# Patient Record
Sex: MALE | Race: WHITE | HISPANIC OR LATINO | Employment: FULL TIME | URBAN - METROPOLITAN AREA
[De-identification: names, ages, dates, MRNs, and addresses within clinical notes are randomized per-mention and may not be internally consistent; named-entity substitution may affect disease eponyms.]

---

## 2019-01-07 ENCOUNTER — EVALUATION (OUTPATIENT)
Dept: PHYSICAL THERAPY | Facility: CLINIC | Age: 44
End: 2019-01-07
Payer: COMMERCIAL

## 2019-01-07 DIAGNOSIS — M54.12 CERVICAL RADICULOPATHY: Primary | ICD-10-CM

## 2019-01-07 PROCEDURE — 97110 THERAPEUTIC EXERCISES: CPT | Performed by: PHYSICAL THERAPIST

## 2019-01-07 PROCEDURE — G8981 BODY POS CURRENT STATUS: HCPCS | Performed by: PHYSICAL THERAPIST

## 2019-01-07 PROCEDURE — 97162 PT EVAL MOD COMPLEX 30 MIN: CPT | Performed by: PHYSICAL THERAPIST

## 2019-01-07 PROCEDURE — G8982 BODY POS GOAL STATUS: HCPCS | Performed by: PHYSICAL THERAPIST

## 2019-01-07 NOTE — LETTER
2019    Harmony Conrad MD  [address]  [facility]  [address] [address]    Patient: Tom Gibson   YOB: 1975   Date of Visit: 2019     Encounter Diagnosis     ICD-10-CM    1  Cervical radiculopathy M54 12        Dear Dr Ted Geiger:    Please review the attached Plan of Care from [address] recent visit  Please verify that you agree therapy should continue by signing the attached document and sending it back to our office  If you have any questions or concerns, please don't hesitate to call  Sincerely,    Justina Webb, PT      Referring Provider:      I certify that I have read the below Plan of Care and certify the need for these services furnished under this plan of treatment while under my care  Harmony Conrad MD  [address] [place] [address]  [address]: [phone]          PT Evaluation     Today's date: 2019  Patient name: Tom Gibson  : 1975  MRN: [id number]  Referring provider: Benita Saldana MD  Dx:   Encounter Diagnosis     ICD-10-CM    1  Cervical radiculopathy M54 12        Start Time: 1052  Stop Time: 1146  Total time in clinic (min): 54 minutes    Assessment  Assessment details: Patient is a 39year old male presenting to physical therapy with right cervical spine pain with radiating down right arm reaching right lateral three fingers  Patient is presenting with signs and symptoms consistent with referring diagnosis of right cervical radiculopathy with C6-C7 nerve root compression  Patient has contributing factors of decreased cervical ROM, decreased right UE strength, and decreased right median nerve mobility  These deficits are limiting his ability to carry boxes at work, sleep through the night without pain, Patient will benefit from physical therapy in order to address the deficits contributing to functional limitations and facilitate return to prior level of functioning   Patient was provided a home exercise program and demonstrated an understanding of exercises  Patient was advised to stop performing home exercise program if symptoms increase or new complaints developed  Verbal understanding demonstrated regarding home exercise program instructions  Patient was educated on and agreeable to plan of care  Impairments: abnormal or restricted ROM, impaired physical strength, lacks appropriate home exercise program and pain with function    Symptom irritability: moderateUnderstanding of Dx/Px/POC: good   Prognosis: good    Goals  Short term goals:  1) Patient will demonstrate decrease in cervical pain by 20-50% in 4 weeks  2) Patient will demonstrate improved cervical rotational ROM by > 5 degrees   in 4 weeks  3) Patient will demonstrate increased right UE strength in 4 weeks  Long term goals:  1) Patient will demonstrate ability to lift 25# box without right arm pain in 6 weeks  2) Patient will demonstrate sleep through night without pain  in 6 weeks  3) Patient will demonstrate ability to reach overhead without right shoulder pain in 6 weeks  4) Patient will demonstrate ability to look over right shoulder without pain in 6 weeks  5) Patient will demonstrate independence with HEP 6 weeks      Plan  Patient would benefit from: skilled physical therapy  Referral necessary: No  Planned modality interventions: cryotherapy  Planned therapy interventions: therapeutic training, therapeutic exercise, therapeutic activities, stretching, strengthening, neuromuscular re-education, manual therapy, joint mobilization, balance, graded exercise, graded activity, gait training, functional ROM exercises and flexibility  Frequency: 2x week  Duration in weeks: 6  Treatment plan discussed with: patient        Subjective Evaluation    History of Present Illness  Mechanism of injury: Patient reports that his pain started in his arm about one month ago  Patient reports that he reports that he woke up with pain   He reports that there was no trauma to cause this pain he reports that about a week later he started having radiating pain up and down his right arm reaching his fingers  Patient reports that his pain has been getting better since its onset  Patient reports that he has not had any imaging of his cervical spine  Patient reports that he was prescribed a muscle relaxer and naproxen for his pain  He reports that this helps  Patients that he gets tingling in his 1st, 2nd and 3rd finger  Patient denies pain in his left side  Patient reports that he has not had symptoms like this in the past  Patient reports that he has been completing some arm stretches which have been helping his pain  Patient reports that he has not had a history of cervical pain  Patient states that he has not had physical therapy prior to this onset  Patient reports that his goals for physical therapy are to get back to lift boxes, driving without pain, and sleep without being interrupted without pain  Pain  Current pain ratin  At best pain ratin  At worst pain rating: 10  Location: Right arm pain to lateral three fingers- intermittent, varying, deep "     Social Support  Lives in: multiple-level home  Lives with: spouse and young children    Employment status: working (- currently off of work due to symptoms )  Hand dominance: right  Exercise history: Patient rpeorts that he is avoiding using the right hand around the house  Paitient reports that he likes to hike, outdoor activity             Objective     Special Questions  Negative for faints, trouble swallowing, visual change, history of cancer and history of trauma    Additional Special Questions  Patient denies numbness or tingling in face or lips  Patient denies changes in ambulation     Neurological Testing     Sensation   Cervical/Thoracic   Left   Intact: light touch    Right   Intact: light touch    Reflexes   Left   Biceps (C5/C6): normal (2+)  Brachioradialis (C6): normal (2+)  Triceps (C7): normal (2+)    Right   Biceps (C5/C6): normal (2+)  Brachioradialis (C6): normal (2+)  Triceps (C7): normal (2+)    Active Range of Motion   Cervical/Thoracic Spine   Cervical    Flexion: 45 (increase right shoulder pain- no worse) degrees   Extension: 60 (decrease, no better) degrees   Left lateral flexion: 45 (increase - no worse) degrees   Right lateral flexion: 32 (increase, no worse ) degrees   Left rotation: 72 (NE) degrees   Right rotation: 70 (Increase right shoulder pain ) degrees     Additional Active Range of Motion Details  Repeated cervical retraction x 10: Decreased right shoulder pain  Repeated cervical retraction with overpressure 2 x 10: decreased right shoulder pain from 5/10 to 2/10 and improve right cervical rotation to 75 degrees without shoulder pain   Strength/Myotome Testing     Left Shoulder     Planes of Motion   Abduction: 4 (pain)     Isolated Muscles   Upper trapezius: 5     Right Shoulder     Planes of Motion   Abduction: 5     Isolated Muscles   Upper trapezius: 5     Left Elbow   Flexion: 5  Extension: 4 (pain)    Right Elbow   Flexion: 5  Extension: 5    Left Wrist/Hand   Wrist flexion: 5    Right Wrist/Hand   Wrist flexion: 5    Tests   Cervical     Right   Positive cervical distraction  Right Shoulder   Positive ULTT1         Flowsheet Rows      Most Recent Value   PT/OT G-Codes   Current Score  53   Projected Score  71   FOTO information reviewed  Yes   Assessment Type  Evaluation   G code set  Changing & Maintaining Body Position   Changing and Maintaining Body Position Current Status ()  CL   Changing and Maintaining Body Position Goal Status ()  CK          Precautions: Cervical Right Radiculopathy, As toelrated    Daily Treatment Diary     Manual  1/7            Manual cervical traction NV                                                                    Exercise Diary  1/7            Cervical reraction 2 x 10            Cerivcal retraction with overpressure 2 x 10            Right UE median nerve glides 10x                                                                                                                                                                                                                                             Modalities  1/7            Cervical ICE  PRN

## 2019-01-07 NOTE — PROGRESS NOTES
PT Evaluation     Today's date: 2019  Patient name: Rachel Piña  : 1975  MRN: [id number]  Referring provider: Emma Sandoval MD  Dx:   Encounter Diagnosis     ICD-10-CM    1  Cervical radiculopathy M54 12        Start Time: 1052  Stop Time: 1146  Total time in clinic (min): 54 minutes    Assessment  Assessment details: Patient is a 39year old male presenting to physical therapy with right cervical spine pain with radiating down right arm reaching right lateral three fingers  Patient is presenting with signs and symptoms consistent with referring diagnosis of right cervical radiculopathy with C6-C7 nerve root compression  Patient has contributing factors of decreased cervical ROM, decreased right UE strength, and decreased right median nerve mobility  These deficits are limiting his ability to carry boxes at work, sleep through the night without pain, Patient will benefit from physical therapy in order to address the deficits contributing to functional limitations and facilitate return to prior level of functioning  Patient was provided a home exercise program and demonstrated an understanding of exercises  Patient was advised to stop performing home exercise program if symptoms increase or new complaints developed  Verbal understanding demonstrated regarding home exercise program instructions  Patient was educated on and agreeable to plan of care       Impairments: abnormal or restricted ROM, impaired physical strength, lacks appropriate home exercise program and pain with function    Symptom irritability: moderateUnderstanding of Dx/Px/POC: good   Prognosis: good    Goals  Short term goals:  1) Patient will demonstrate decrease in cervical pain by 20-50% in 4 weeks  2) Patient will demonstrate improved cervical rotational ROM by > 5 degrees   in 4 weeks  3) Patient will demonstrate increased right UE strength in 4 weeks  Long term goals:  1) Patient will demonstrate ability to lift 25# box without right arm pain in 6 weeks  2) Patient will demonstrate sleep through night without pain  in 6 weeks  3) Patient will demonstrate ability to reach overhead without right shoulder pain in 6 weeks  4) Patient will demonstrate ability to look over right shoulder without pain in 6 weeks  5) Patient will demonstrate independence with HEP 6 weeks      Plan  Patient would benefit from: skilled physical therapy  Referral necessary: No  Planned modality interventions: cryotherapy  Planned therapy interventions: therapeutic training, therapeutic exercise, therapeutic activities, stretching, strengthening, neuromuscular re-education, manual therapy, joint mobilization, balance, graded exercise, graded activity, gait training, functional ROM exercises and flexibility  Frequency: 2x week  Duration in weeks: 6  Treatment plan discussed with: patient        Subjective Evaluation    History of Present Illness  Mechanism of injury: Patient reports that his pain started in his arm about one month ago  Patient reports that he reports that he woke up with pain  He reports that there was no trauma to cause this pain he reports that about a week later he started having radiating pain up and down his right arm reaching his fingers  Patient reports that his pain has been getting better since its onset  Patient reports that he has not had any imaging of his cervical spine  Patient reports that he was prescribed a muscle relaxer and naproxen for his pain  He reports that this helps  Patients that he gets tingling in his 1st, 2nd and 3rd finger  Patient denies pain in his left side  Patient reports that he has not had symptoms like this in the past  Patient reports that he has been completing some arm stretches which have been helping his pain  Patient reports that he has not had a history of cervical pain  Patient states that he has not had physical therapy prior to this onset   Patient reports that his goals for physical therapy are to get back to lift boxes, driving without pain, and sleep without being interrupted without pain  Pain  Current pain ratin  At best pain ratin  At worst pain rating: 10  Location: Right arm pain to lateral three fingers- intermittent, varying, deep "     Social Support  Lives in: multiple-level home  Lives with: spouse and young children    Employment status: working (- currently off of work due to symptoms )  Hand dominance: right  Exercise history: Patient rpeorts that he is avoiding using the right hand around the house  Paitient reports that he likes to hike, outdoor activity  Objective     Special Questions  Negative for faints, trouble swallowing, visual change, history of cancer and history of trauma    Additional Special Questions  Patient denies numbness or tingling in face or lips  Patient denies changes in ambulation     Neurological Testing     Sensation   Cervical/Thoracic   Left   Intact: light touch    Right   Intact: light touch    Reflexes   Left   Biceps (C5/C6): normal (2+)  Brachioradialis (C6): normal (2+)  Triceps (C7): normal (2+)    Right   Biceps (C5/C6): normal (2+)  Brachioradialis (C6): normal (2+)  Triceps (C7): normal (2+)    Active Range of Motion   Cervical/Thoracic Spine   Cervical    Flexion: 45 (increase right shoulder pain- no worse) degrees   Extension: 60 (decrease, no better) degrees   Left lateral flexion: 45 (increase - no worse) degrees   Right lateral flexion: 32 (increase, no worse ) degrees   Left rotation: 72 (NE) degrees   Right rotation: 70 (Increase right shoulder pain ) degrees     Additional Active Range of Motion Details  Repeated cervical retraction x 10: Decreased right shoulder pain  Repeated cervical retraction with overpressure 2 x 10: decreased right shoulder pain from 5/10 to 2/10 and improve right cervical rotation to 75 degrees without shoulder pain       Strength/Myotome Testing     Left Shoulder     Planes of Motion   Abduction: 4 (pain)     Isolated Muscles   Upper trapezius: 5     Right Shoulder     Planes of Motion   Abduction: 5     Isolated Muscles   Upper trapezius: 5     Left Elbow   Flexion: 5  Extension: 4 (pain)    Right Elbow   Flexion: 5  Extension: 5    Left Wrist/Hand   Wrist flexion: 5    Right Wrist/Hand   Wrist flexion: 5    Tests   Cervical     Right   Positive cervical distraction  Right Shoulder   Positive ULTT1         Flowsheet Rows      Most Recent Value   PT/OT G-Codes   Current Score  53   Projected Score  71   FOTO information reviewed  Yes   Assessment Type  Evaluation   G code set  Changing & Maintaining Body Position   Changing and Maintaining Body Position Current Status ()  CL   Changing and Maintaining Body Position Goal Status ()  CK          Precautions: Cervical Right Radiculopathy, As toelrated    Daily Treatment Diary     Manual  1/7            Manual cervical traction NV                                                                    Exercise Diary  1/7            Cervical reraction 2 x 10            Cerivcal retraction with overpressure 2 x 10            Right UE median nerve glides 10x                                                                                                                                                                                                                                             Modalities  1/7            Cervical ICE  PRN

## 2019-01-08 ENCOUNTER — OFFICE VISIT (OUTPATIENT)
Dept: PHYSICAL THERAPY | Facility: CLINIC | Age: 44
End: 2019-01-08
Payer: COMMERCIAL

## 2019-01-08 DIAGNOSIS — M54.12 CERVICAL RADICULOPATHY: Primary | ICD-10-CM

## 2019-01-08 PROCEDURE — 97110 THERAPEUTIC EXERCISES: CPT

## 2019-01-08 PROCEDURE — 97140 MANUAL THERAPY 1/> REGIONS: CPT

## 2019-01-08 NOTE — PROGRESS NOTES
Daily Note     Today's date: 2019  Patient name: Elena Randhawa  : 1975  MRN: [id number]  Referring provider: Giovanni Fry, PT  Dx:   Encounter Diagnosis     ICD-10-CM    1  Cervical radiculopathy M54 12                   Subjective: Pt states he woke up with R c/s pain down R shoulder and into 1st 3 fingers  Pt states he did not perform HEP  Pt states he had no pain after leaving PT for IE yesterday  Objective: See treatment diary below  Precautions: Cervical Right Radiculopathy, As toelrated    Daily Treatment Diary     Manual             Manual cervical traction NV 8'                                                                   Exercise Diary             Cervical reraction 2 x 10 2x10           Cerivcal retraction with overpressure 2 x 10 2x10           Right UE median nerve glides 10x x10           UBE  3'f/3'b           Cervical retraction with ext  2x20           pec stretch  30"x5           TB row/ext  GTB 2x10                                                                                                                                                                                        Modalities              Cervical ICE  PRN                                          Assessment: Pain decreased to R forearm in R 1st 3 digits post cervical retraction with OP   1st 3 digit pain abolished with addition of c/s retraction with ext  Pain in R forearm abolished after manual cervical traction  After traction, pt reported return of symptoms in R elbow/forearm, which was not changed with c/s retraction with ext  Forearm pain was then relieved with self median n  Wilson  Plan: Continue per plan of care

## 2019-01-09 ENCOUNTER — PROCEDURE VISIT (OUTPATIENT)
Dept: NEUROLOGY | Facility: CLINIC | Age: 44
End: 2019-01-09
Payer: COMMERCIAL

## 2019-01-09 DIAGNOSIS — S54.12XA INJURY OF MEDIAN NERVE AT LEFT FOREARM LEVEL, INITIAL ENCOUNTER: ICD-10-CM

## 2019-01-09 DIAGNOSIS — G56.01 CARPAL TUNNEL SYNDROME ON RIGHT: ICD-10-CM

## 2019-01-09 PROCEDURE — 95910 NRV CNDJ TEST 7-8 STUDIES: CPT | Performed by: PHYSICAL MEDICINE & REHABILITATION

## 2019-01-09 PROCEDURE — 95886 MUSC TEST DONE W/N TEST COMP: CPT | Performed by: PHYSICAL MEDICINE & REHABILITATION

## 2019-01-09 NOTE — PROGRESS NOTES
The procedure was discussed with the patient  Verbal consent was obtained after discussing risks and benefits  A sterile concentric needle electrode was used  The patient tolerated the procedure well  There were no complications  EMG REPORT    Test:Bilateral Upper Extremities    Performing Dr:  KANDIS Guthrie  The left and right median and ulnar motor conduction velocities and compound muscle action potentials were normal with normal distal latencies across the wrists  The left median and  bilateral ulnar sensory conduction velocity and sensory action potentials were also normal with normal distal latencies across the wrists  The left median sensory peak latency latency prolongedged at 3 4 milliseconds  with a normal sensory action with a normal sensory action potential amplitude  The right median palmar evoked response was prolonged by 0 7 milliseconds as compared to the right ulnar palmar evoked response to the same distance  The left median palmar evoked response was prolonged by 0 4 milliseconds as compared to the left ulnar palmar evoked response at the same distance  The left and right median and ulnar F wave latencies were within normal limits  Concentric needle EMG of the upper extremities bilaterally and cervical paraspinal muscles revealed no spontaneous activities  Early recruited motor units appear normal   Recruitment patterns were full or full for effort  INTERPRETATION: There   is electrophysiologic evidence of a:    1  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

## 2019-01-11 ENCOUNTER — OFFICE VISIT (OUTPATIENT)
Dept: PHYSICAL THERAPY | Facility: CLINIC | Age: 44
End: 2019-01-11
Payer: COMMERCIAL

## 2019-01-11 DIAGNOSIS — M54.12 CERVICAL RADICULOPATHY: Primary | ICD-10-CM

## 2019-01-11 PROCEDURE — 97140 MANUAL THERAPY 1/> REGIONS: CPT | Performed by: PHYSICAL THERAPIST

## 2019-01-11 PROCEDURE — 97110 THERAPEUTIC EXERCISES: CPT | Performed by: PHYSICAL THERAPIST

## 2019-01-11 PROCEDURE — 97112 NEUROMUSCULAR REEDUCATION: CPT | Performed by: PHYSICAL THERAPIST

## 2019-01-11 NOTE — PROGRESS NOTES
Daily Note     Today's date: 2019  Patient name: Micaela Ovalles  : 1975  MRN: [id number]  Referring provider: Ted Loyola PT  Dx:   Encounter Diagnosis     ICD-10-CM    1  Cervical radiculopathy M54 12        Start Time: 813  Stop Time:   Total time in clinic (min): 39 minutes    Subjective: Patient reports that he has been feeling better with less pain in his right arm  He states that he has only been having pain on the lateral aspect of his right elbow         Objective: See treatment diary below  Precautions: Cervical Right Radiculopathy, As toelrated     Daily Treatment Diary      Manual                   Manual cervical traction NV 8'  TK 8 min                  Right UE radial nerve glides     TK 3 min                                                                                               Exercise Diary                   Cervical reraction 2 x 10 2x10  2x10                 Cerivcal retraction with overpressure 2 x 10 2x10  2x10                 Right UE median nerve glides 10x x10  x10                 UBE   3'f/3'b  3'f/3'b                 Cervical retraction with ext   2x20  2x20                 pec stretch   30"x5  30"x5                 TB row/ext   GTB 2x10  GTB 2x10                  Dynamic right wrist extensor stretch     3 sec hhold x 10                  Right wrist flexor stretch      5 sec x 10                  Right upper trapezius stretch     10 sec x 5                  no monies     2 x 10                                                                                                                                                                                                                                               Modalities                       Cervical ICE  PRN                                                                            Assessment: Patient reported abolished right elbow pain following manual cervical traction and right radial nerve glides  He was educated to add dynamic right wrist extensor stretch at home in order to maintain progress made within today's session  He will continue to benefit from PT in order to improve right radial nerve mobility and improver cervical spine mobility  Plan: Continue per plan of care

## 2019-02-08 NOTE — PROGRESS NOTES
PT Discharge    Today's date: 2019  Patient name: Linden Kent  : 1975  MRN: [id number]  Referring provider: Mary Daniel, PT  Dx:   Encounter Diagnosis     ICD-10-CM    1  Cervical radiculopathy M54 12        Start Time: 813  Stop Time:   Total time in clinic (min): 39 minutes    Assessment  Assessment details: Patient did not return to physical therapy following this appointment  Objective measurement and functional goals were unable to be updated at this time  Patient will be discharged from PT at this time      Impairments: abnormal or restricted ROM, impaired physical strength, lacks appropriate home exercise program and pain with function    Symptom irritability: moderateUnderstanding of Dx/Px/POC: good   Prognosis: good    Goals  Short term goals:  1) Patient will demonstrate decrease in cervical pain by 20-50% in 4 weeks- not met  2) Patient will demonstrate improved cervical rotational ROM by > 5 degrees   in 4 weeks- not met  3) Patient will demonstrate increased right UE strength in 4 weeks- not met  Long term goals:  1) Patient will demonstrate ability to lift 25# box without right arm pain in 6 weeks- not met  2) Patient will demonstrate sleep through night without pain  in 6 weeks-not met  3) Patient will demonstrate ability to reach overhead without right shoulder pain in 6 weeks-not met  4) Patient will demonstrate ability to look over right shoulder without pain in 6 weeks-not met  5) Patient will demonstrate independence with HEP 6 weeks-not met       Plan  Patient would benefit from: skilled physical therapy  Referral necessary: No  Planned modality interventions: cryotherapy  Planned therapy interventions: therapeutic training, therapeutic exercise, therapeutic activities, stretching, strengthening, neuromuscular re-education, manual therapy, joint mobilization, balance, graded exercise, graded activity, gait training, functional ROM exercises and flexibility  Frequency: 2x week  Duration in weeks: 6  Treatment plan discussed with: patient        Subjective Evaluation    Pain  Current pain ratin  At best pain ratin  At worst pain rating: 10  Location: Right arm pain to lateral three fingers- intermittent, varying, deep "     Social Support  Lives in: multiple-level home  Lives with: spouse and young children    Employment status: working (- currently off of work due to symptoms )  Hand dominance: right  Exercise history: Patient rpeorts that he is avoiding using the right hand around the house  Paitient reports that he likes to hike, outdoor activity  Objective     Concurrent Complaints  Negative for faints, trouble swallowing, visual change, history of cancer and history of trauma    Additional Special Questions  Patient denies numbness or tingling in face or lips  Patient denies changes in ambulation     Neurological Testing     Sensation   Cervical/Thoracic   Left   Intact: light touch    Right   Intact: light touch    Reflexes   Left   Biceps (C5/C6): normal (2+)  Brachioradialis (C6): normal (2+)  Triceps (C7): normal (2+)    Right   Biceps (C5/C6): normal (2+)  Brachioradialis (C6): normal (2+)  Triceps (C7): normal (2+)    Active Range of Motion   Cervical/Thoracic Spine       Cervical    Flexion: 45 (increase right shoulder pain- no worse) degrees   Extension: 60 (decrease, no better) degrees      Left lateral flexion: 45 (increase - no worse) degrees      Right lateral flexion: 32 (increase, no worse ) degrees      Left rotation: 72 (NE) degrees  Right rotation: 70 (Increase right shoulder pain ) degrees           Additional Active Range of Motion Details  Repeated cervical retraction x 10: Decreased right shoulder pain  Repeated cervical retraction with overpressure 2 x 10: decreased right shoulder pain from 5/10 to 2/10 and improve right cervical rotation to 75 degrees without shoulder pain       Strength/Myotome Testing     Left Shoulder Planes of Motion   Abduction: 4 (pain)     Isolated Muscles   Upper trapezius: 5     Right Shoulder     Planes of Motion   Abduction: 5     Isolated Muscles   Upper trapezius: 5     Left Elbow   Flexion: 5  Extension: 4 (pain)    Right Elbow   Flexion: 5  Extension: 5    Left Wrist/Hand   Wrist flexion: 5    Right Wrist/Hand   Wrist flexion: 5    Tests     Right Shoulder   Positive [id number]             Precautions: Cervical Right Radiculopathy, As toelrated

## 2021-02-16 ENCOUNTER — TELEPHONE (OUTPATIENT)
Dept: GASTROENTEROLOGY | Facility: CLINIC | Age: 46
End: 2021-02-16

## 2021-08-03 ENCOUNTER — EVALUATION (OUTPATIENT)
Dept: PHYSICAL THERAPY | Facility: CLINIC | Age: 46
End: 2021-08-03
Payer: COMMERCIAL

## 2021-08-03 DIAGNOSIS — M54.41 ACUTE RIGHT-SIDED LOW BACK PAIN WITH RIGHT-SIDED SCIATICA: Primary | ICD-10-CM

## 2021-08-03 PROCEDURE — 97110 THERAPEUTIC EXERCISES: CPT | Performed by: PHYSICAL THERAPIST

## 2021-08-03 PROCEDURE — 97014 ELECTRIC STIMULATION THERAPY: CPT | Performed by: PHYSICAL THERAPIST

## 2021-08-03 PROCEDURE — G0283 ELEC STIM OTHER THAN WOUND: HCPCS | Performed by: PHYSICAL THERAPIST

## 2021-08-03 PROCEDURE — 97161 PT EVAL LOW COMPLEX 20 MIN: CPT | Performed by: PHYSICAL THERAPIST

## 2021-08-03 NOTE — LETTER
2021    Lizbeth Mcmahan MD  [address]  [facility]  [place] [address]    Patient: Tri Jackman   YOB: 1975   Date of Visit: 8/3/2021     Encounter Diagnosis     ICD-10-CM    1  Acute right-sided low back pain with right-sided sciatica  M54 41        Dear Dr Morgan Mclaughlin:    Thank you for your recent referral of Tri Jackman  Please review the attached evaluation summary from [address] [place] [address] recent visit  Please verify that you agree with the plan of care by signing the attached order  If you have any questions or concerns, please do not hesitate to call  I sincerely appreciate the opportunity to share in the care of one of your patients and hope to have another opportunity to work with you in the near future  Sincerely,    Yash Bellamy, PT      Referring Provider:      I certify that I have read the below Plan of Care and certify the need for these services furnished under this plan of treatment while under my care  Adolfo Garcia Garry  [address] [place] [address]  Via Fax: [phone]          PT Evaluation     Today's date: 8/3/2021  Patient name: Tri Jackman  : 1975  MRN: [id number]  Referring provider: Veleta Buerger, MD  Dx:   Encounter Diagnosis     ICD-10-CM    1  Acute right-sided low back pain with right-sided sciatica  M54 41                   Assessment  Assessment details: Patient is a 51 t/o male with chief complaints of right sided lumbar pain with right sided sciatica that began two weeks ago  Patient presents with decreased functional mobility due to increased pain, decreased hip & LE strength, decreased lumbar ROM, gait deviations including antalgic gait associated with nerve root impingement and possible HNP  Today he responds well to extension based exercise with a centralization of symptoms  No red flags present    Patient will benefit from skilled physical therapy to address impairment and improve functional mobility  PT needed to allow for return to maximal function and improve quality of life  Impairments: abnormal or restricted ROM, activity intolerance, impaired physical strength, lacks appropriate home exercise program and pain with function  Understanding of Dx/Px/POC: good   Prognosis: good    Goals  STG within 4 weeks:   1  Patient to be independent in HEP  2  Reduce pain by 50% to improve quality of life  3  Improve lumbar extension ROM to no more than minimal limitation  LTG within 8 weeks:   1  Patient to be independent in ADLs/IADLs without difficulty  2  Patient to be able to sit with lumbar roll for 10-20 minutes  3  Patient to be able to drive short distances with minimal pain  Plan  Patient would benefit from: skilled physical therapy and PT eval  Planned modality interventions: cryotherapy, hydrotherapy, unattended electrical stimulation, H-Wave, TENS and traction  Planned therapy interventions: therapeutic training, therapeutic exercise, therapeutic activities, stretching, strengthening, postural training, patient education, neuromuscular re-education, manual therapy, joint mobilization, IADL retraining, activity modification, ADL retraining, ADL training, body mechanics training, flexibility, functional ROM exercises, gait training, graded activity, graded exercise, graded motor, home exercise program and abdominal trunk stabilization  Frequency: 2x week  Duration in weeks: 8  Plan of Care beginning date: 8/3/2021  Plan of Care expiration date: 9/28/2021  Treatment plan discussed with: patient        Subjective Evaluation    History of Present Illness  Onset date: two weeks ago  Mechanism of injury: Patient is a 50 y/o male with chief complaints of right sided low back pain that radiates down his right leg into his foot  This began two weeks ago when he lifted a tree trunk  He states he was bent over and his pain began about 5 minutes after lifting    He reports weakness within his RLE  He denies any bowel/bladder symptoms or saddle anesthesia  He was seen at PCP office and referred for evaluation and treatment of low back pain  Not a recurrent problem   Pain  Current pain ratin  At best pain ratin  At worst pain rating: 10  Quality: cramping and sharp  Progression: no change    Social Support  Steps to enter house: yes  Stairs in house: yes   Lives in: multiple-level home  Lives with: spouse and young children    Work[employer]- not currently working   Exercise history: active with work, but no formal exercise       Diagnostic Tests  No diagnostic tests performed  Treatments  Current treatment: medication  Patient Goals  Patient goals for therapy: return to work  Patient goal: "get well"         Objective     Active Range of Motion     Lumbar   Extension:  with pain Restriction level: maximal    Additional Active Range of Motion Details  AROM flexion/side bending not tested; maximally limited with extension   Mechanical Assessment    Cervical      Thoracic      Lumbar    Standing extension: repeated movements  Pain location: centralized  Pain intensity: better  Pain level: decreased  Lying extension: repeated movements  Pain location: centralized  Pain intensity: better  Pain level: decreased    Strength/Myotome Testing     Left Hip   Planes of Motion   Flexion: 3+    Right Hip   Planes of Motion   Flexion: 3+    Left Knee   Flexion: 4+  Extension: 4+    Right Knee   Flexion: 4+  Extension: 4+    Left Ankle/Foot   Dorsiflexion: 4+  Great toe extension: 5    Right Ankle/Foot   Dorsiflexion: 4+  Great toe extension: 5    Additional Strength Details  Right sided low back pain with left hip flexionr              Precautions: none   Increased time spent on patient education with diagnosis, prognosis, goals of therapy, progression of therapy, and plan of care  All questions answered  Patient instructed to call clinic with questions or concerns  Written HEP provided to patient  Manuals 8/3                                                                Neuro Re-Ed             Sitting with lumbar roll Pt edu            TA engagement NV            Adductor set NV            Glute set NV                                                   Ther Ex             Pt Edu KS            Standing lumbar extension x10             Prone press up on elbows 2x10             Prone lying during heat/stim 10 min                                                                Ther Activity                                       Gait Training                                       Modalities             TENS- B lumbar paraspinals 10'            Hot pack 10'                  [employer]    Access Code: [email or web address]

## 2021-08-03 NOTE — PROGRESS NOTES
PT Evaluation     Today's date: 8/3/2021  Patient name: Cassy Cheung  : 1975  MRN: [id number]  Referring provider: Daniel Lynch MD  Dx:   Encounter Diagnosis     ICD-10-CM    1  Acute right-sided low back pain with right-sided sciatica  M54 41                   Assessment  Assessment details: Patient is a 51 t/o male with chief complaints of right sided lumbar pain with right sided sciatica that began two weeks ago  Patient presents with decreased functional mobility due to increased pain, decreased hip & LE strength, decreased lumbar ROM, gait deviations including antalgic gait associated with nerve root impingement and possible HNP  Today he responds well to extension based exercise with a centralization of symptoms  No red flags present  Patient will benefit from skilled physical therapy to address impairment and improve functional mobility  PT needed to allow for return to maximal function and improve quality of life  Impairments: abnormal or restricted ROM, activity intolerance, impaired physical strength, lacks appropriate home exercise program and pain with function  Understanding of Dx/Px/POC: good   Prognosis: good    Goals  STG within 4 weeks:   1  Patient to be independent in HEP  2  Reduce pain by 50% to improve quality of life  3  Improve lumbar extension ROM to no more than minimal limitation  LTG within 8 weeks:   1  Patient to be independent in ADLs/IADLs without difficulty  2  Patient to be able to sit with lumbar roll for 10-20 minutes  3  Patient to be able to drive short distances with minimal pain       Plan  Patient would benefit from: skilled physical therapy and PT eval  Planned modality interventions: cryotherapy, hydrotherapy, unattended electrical stimulation, H-Wave, TENS and traction  Planned therapy interventions: therapeutic training, therapeutic exercise, therapeutic activities, stretching, strengthening, postural training, patient education, neuromuscular re-education, manual therapy, joint mobilization, IADL retraining, activity modification, ADL retraining, ADL training, body mechanics training, flexibility, functional ROM exercises, gait training, graded activity, graded exercise, graded motor, home exercise program and abdominal trunk stabilization  Frequency: 2x week  Duration in weeks: 8  Plan of Care beginning date: 8/3/2021  Plan of Care expiration date: 2021  Treatment plan discussed with: patient        Subjective Evaluation    History of Present Illness  Onset date: two weeks ago  Mechanism of injury: Patient is a 50 y/o male with chief complaints of right sided low back pain that radiates down his right leg into his foot  This began two weeks ago when he lifted a tree trunk  He states he was bent over and his pain began about 5 minutes after lifting  He reports weakness within his RLE  He denies any bowel/bladder symptoms or saddle anesthesia  He was seen at PCP office and referred for evaluation and treatment of low back pain             Not a recurrent problem   Pain  Current pain ratin  At best pain ratin  At worst pain rating: 10  Quality: cramping and sharp  Progression: no change    Social Support  Steps to enter house: yes  Stairs in house: yes   Lives in: multiple-level home  Lives with: spouse and young children    Workin [employer]- not currently working   Exercise history: active with work, but no formal exercise       Diagnostic Tests  No diagnostic tests performed  Treatments  Current treatment: medication  Patient Goals  Patient goals for therapy: return to work  Patient goal: "get well"         Objective     Active Range of Motion     Lumbar   Extension:  with pain Restriction level: maximal    Additional Active Range of Motion Details  AROM flexion/side bending not tested; maximally limited with extension   Mechanical Assessment    Cervical      Thoracic      Lumbar    Standing extension: repeated movements  Pain location: centralized  Pain intensity: better  Pain level: decreased  Lying extension: repeated movements  Pain location: centralized  Pain intensity: better  Pain level: decreased    Strength/Myotome Testing     Left Hip   Planes of Motion   Flexion: 3+    Right Hip   Planes of Motion   Flexion: 3+    Left Knee   Flexion: 4+  Extension: 4+    Right Knee   Flexion: 4+  Extension: 4+    Left Ankle/Foot   Dorsiflexion: 4+  Great toe extension: 5    Right Ankle/Foot   Dorsiflexion: 4+  Great toe extension: 5    Additional Strength Details  Right sided low back pain with left hip flexionr              Precautions: none   Increased time spent on patient education with diagnosis, prognosis, goals of therapy, progression of therapy, and plan of care  All questions answered  Patient instructed to call clinic with questions or concerns  Written HEP provided to patient  Manuals 8/3                                                                Neuro Re-Ed             Sitting with lumbar roll Pt edu            TA engagement NV            Adductor set NV            Glute set NV                                                   Ther Ex             Pt Edu KS            Standing lumbar extension x10             Prone press up on elbows 2x10             Prone lying during heat/stim 10 min                                                                Ther Activity                                       Gait Training                                       Modalities             TENS- B lumbar paraspinals 10'            Hot pack 10'                  [employer]    Access Code: [email or web address]

## 2021-08-05 ENCOUNTER — OFFICE VISIT (OUTPATIENT)
Dept: PHYSICAL THERAPY | Facility: CLINIC | Age: 46
End: 2021-08-05
Payer: COMMERCIAL

## 2021-08-05 DIAGNOSIS — M54.41 ACUTE RIGHT-SIDED LOW BACK PAIN WITH RIGHT-SIDED SCIATICA: Primary | ICD-10-CM

## 2021-08-05 PROCEDURE — 97014 ELECTRIC STIMULATION THERAPY: CPT | Performed by: PHYSICAL THERAPIST

## 2021-08-05 PROCEDURE — 97140 MANUAL THERAPY 1/> REGIONS: CPT | Performed by: PHYSICAL THERAPIST

## 2021-08-05 PROCEDURE — 97110 THERAPEUTIC EXERCISES: CPT | Performed by: PHYSICAL THERAPIST

## 2021-08-05 PROCEDURE — 97112 NEUROMUSCULAR REEDUCATION: CPT | Performed by: PHYSICAL THERAPIST

## 2021-08-05 PROCEDURE — G0283 ELEC STIM OTHER THAN WOUND: HCPCS | Performed by: PHYSICAL THERAPIST

## 2021-08-05 NOTE — PROGRESS NOTES
Daily Note     Today's date: 2021  Patient name: Klever Aguilar  : 1975  MRN: [id number]  Referring provider: Mitchel Lawrence MD  Dx:   Encounter Diagnosis     ICD-10-CM    1  Acute right-sided low back pain with right-sided sciatica  M54 41                   Subjective: Patient reports 4-5/10 pain at start of session  States he's doing better  He does report pain when he drives for a long period of time  Objective: See treatment diary below      Assessment: Tolerated treatment well  Patient does well with continued extension based exercise  He remains with some right sided low back pain, but this is decreased following  to lumbar region  Progression of HEP to include core strengthening exercises; updated HEP provided to patient  Patient would benefit from continued PT      Plan: Continue per plan of care  Precautions: none   Updated written HEP provided to patient         Manuals 8/3 8/5           Prone Lumbar   8 min                                                  Neuro Re-Ed             Sitting with lumbar roll Pt edu            TA engagement NV 3"x20            Adductor set NV 3"x20            Glute set NV            Hooklying TB hip abd  Red 2x10 ea                                      Ther Ex             Pt Edu KS            Standing lumbar extension x10  2x10            Prone press up on elbows 2x10  2x10            Prone lying during heat/stim 10 min 10 min           Recumbent bike w roll  7 min                                                  Ther Activity                                       Gait Training                                       Modalities             TENS- B lumbar paraspinals 10' 10'           Hot pack 10' 10'

## 2021-08-09 ENCOUNTER — APPOINTMENT (OUTPATIENT)
Dept: PHYSICAL THERAPY | Facility: CLINIC | Age: 46
End: 2021-08-09
Payer: COMMERCIAL

## 2021-08-13 ENCOUNTER — APPOINTMENT (OUTPATIENT)
Dept: PHYSICAL THERAPY | Facility: CLINIC | Age: 46
End: 2021-08-13
Payer: COMMERCIAL

## 2021-08-16 ENCOUNTER — APPOINTMENT (OUTPATIENT)
Dept: PHYSICAL THERAPY | Facility: CLINIC | Age: 46
End: 2021-08-16
Payer: COMMERCIAL

## 2021-08-23 ENCOUNTER — OFFICE VISIT (OUTPATIENT)
Dept: PHYSICAL THERAPY | Facility: CLINIC | Age: 46
End: 2021-08-23
Payer: COMMERCIAL

## 2021-08-23 DIAGNOSIS — M54.41 ACUTE RIGHT-SIDED LOW BACK PAIN WITH RIGHT-SIDED SCIATICA: Primary | ICD-10-CM

## 2021-08-23 PROCEDURE — 97112 NEUROMUSCULAR REEDUCATION: CPT | Performed by: PHYSICAL THERAPIST

## 2021-08-23 PROCEDURE — 97110 THERAPEUTIC EXERCISES: CPT | Performed by: PHYSICAL THERAPIST

## 2021-08-23 PROCEDURE — 97014 ELECTRIC STIMULATION THERAPY: CPT | Performed by: PHYSICAL THERAPIST

## 2021-08-23 PROCEDURE — 97140 MANUAL THERAPY 1/> REGIONS: CPT | Performed by: PHYSICAL THERAPIST

## 2021-08-23 PROCEDURE — G0283 ELEC STIM OTHER THAN WOUND: HCPCS | Performed by: PHYSICAL THERAPIST

## 2021-08-23 NOTE — PROGRESS NOTES
Daily Note     Today's date: 2021  Patient name: Alex Moya  : 1975  MRN: [id number]  Referring provider: Raymond Pond MD  Dx:   Encounter Diagnosis     ICD-10-CM    1  Acute right-sided low back pain with right-sided sciatica  M54 41                   Subjective: Patient states he feels 85% global rating of improvement  Reports pain when driving for long periods of time  States he drove 12 hours recently, which caused him pain  Objective: See treatment diary below      Assessment: Tolerated treatment well  Continued good response to  and extension based exercise  However, recommend more consistent attendance at therapy in order to maximize effects of therapy  Patient would benefit from continued PT  Less pain from start to finish of session  Plan: Continue per plan of care        Precautions: none         Manuals 8/3 8/5 8/23          Prone Lumbar   8 min 8 min                                                  Neuro Re-Ed             Sitting with lumbar roll Pt edu  HEP review          TA engagement NV 3"x20  3"x20           Adductor set NV 3"x20  3"x20           Glute set NV  bridge x20          Hooklying TB hip abd  Red 2x10 ea  Red 2x10 ea           Prone hip ext   2x10 ea                       Ther Ex             Pt Edu KS  KS          Standing lumbar extension x10  2x10            Prone press up on elbows 2x10  2x10  2x10           Prone lying during heat/stim 10 min 10 min 10 min           Recumbent bike w roll  7 min 10 min           Supine nerve glides   x20           Piriformis stretch   5x20"           Prone quad stretch w strap   5x20" L; R by PT          Hamstring stretch by PT   Trial; positive neural tension           Ther Activity                                       Gait Training                                       Modalities             TENS- B lumbar paraspinals 10' 10' 10'          Hot pack 10' 10' 10'

## 2021-08-26 ENCOUNTER — OFFICE VISIT (OUTPATIENT)
Dept: PHYSICAL THERAPY | Facility: CLINIC | Age: 46
End: 2021-08-26
Payer: COMMERCIAL

## 2021-08-26 DIAGNOSIS — M54.41 ACUTE RIGHT-SIDED LOW BACK PAIN WITH RIGHT-SIDED SCIATICA: Primary | ICD-10-CM

## 2021-08-26 PROCEDURE — 97530 THERAPEUTIC ACTIVITIES: CPT | Performed by: PHYSICAL THERAPIST

## 2021-08-26 PROCEDURE — 97112 NEUROMUSCULAR REEDUCATION: CPT | Performed by: PHYSICAL THERAPIST

## 2021-08-26 PROCEDURE — 97110 THERAPEUTIC EXERCISES: CPT | Performed by: PHYSICAL THERAPIST

## 2021-08-26 NOTE — PROGRESS NOTES
Daily Note     Today's date: 2021  Patient name: Tri Jackman  : 1975  MRN: [id number]  Referring provider: Veleta Buerger, MD  Dx:   Encounter Diagnosis     ICD-10-CM    1  Acute right-sided low back pain with right-sided sciatica  M54 41                   Subjective: Patient continues to report pain only when driving  By the end of session, he reports he's "almost back to new "       Objective: See treatment diary below      Assessment: Tolerated treatment well  Patient able to progress core strengthening exercises and higher level functional mobility  Reduced pain from start to finish of session  Patient would benefit from continued PT  Plan: Continue per plan of care        Precautions: none         Manuals 8/3 8/5 8/23 8/26         Prone Lumbar   8 min 8 min  8 min                                                Neuro Re-Ed             Sitting with lumbar roll Pt edu  HEP review          TA engagement NV 3"x20  3"x20  standing DLS w PB 5"E96          Adductor set NV 3"x20  3"x20           Glute set NV  bridge x20          Hooklying TB hip abd  Red 2x10 ea  Red 2x10 ea  Red 2x10 ea          Prone hip ext   2x10 ea          Paloff press    Green x20 ea          Ther Ex             Pt Edu KS  KS          Standing lumbar extension x10  2x10   2x10         Prone press up on elbows 2x10  2x10  2x10  Lock & sag x 10          Prone lying during heat/stim 10 min 10 min 10 min  10 min         Recumbent bike w roll  7 min 10 min  10 min         Supine nerve glides   x20           Piriformis stretch   5x20"           Prone quad stretch w strap   5x20" L; R by PT          Hamstring stretch by PT   Trial; positive neural tension           Ther Activity             Mini squat    2x10          Box lift    Box + 10# x 5         Kettlebell carry-outstretched arms    10# 20 ft x 6         Gait Training                                       Modalities             TENS- B lumbar paraspinals 10' 10' 10' 10' Hot pack 10' 10' 10' 10'

## 2021-08-31 ENCOUNTER — OFFICE VISIT (OUTPATIENT)
Dept: PHYSICAL THERAPY | Facility: CLINIC | Age: 46
End: 2021-08-31
Payer: COMMERCIAL

## 2021-08-31 DIAGNOSIS — M54.41 ACUTE RIGHT-SIDED LOW BACK PAIN WITH RIGHT-SIDED SCIATICA: Primary | ICD-10-CM

## 2021-08-31 PROCEDURE — G0283 ELEC STIM OTHER THAN WOUND: HCPCS | Performed by: PHYSICAL THERAPIST

## 2021-08-31 PROCEDURE — 97014 ELECTRIC STIMULATION THERAPY: CPT | Performed by: PHYSICAL THERAPIST

## 2021-08-31 PROCEDURE — 97110 THERAPEUTIC EXERCISES: CPT | Performed by: PHYSICAL THERAPIST

## 2021-08-31 PROCEDURE — 97112 NEUROMUSCULAR REEDUCATION: CPT | Performed by: PHYSICAL THERAPIST

## 2021-08-31 PROCEDURE — 97140 MANUAL THERAPY 1/> REGIONS: CPT | Performed by: PHYSICAL THERAPIST

## 2021-08-31 NOTE — PROGRESS NOTES
Daily Note     Today's date: 2021  Patient name: Rene Bell  : 1975  MRN: [id number]  Referring provider: Darron Ferreira MD  Dx:   Encounter Diagnosis     ICD-10-CM    1  Acute right-sided low back pain with right-sided sciatica  M54 41                   Subjective: Patient continues to report pain only when driving  States he drove several hours to [facility]  Objective: See treatment diary below      Assessment: Tolerated treatment well  Addition of kneeling hip flexor strength to address tightness within anterior hips  He continues to be encouraged to utilize extension based exercises and lumbar roll when sitting and driving  Patient would benefit from continued PT  Plan: Continue per plan of care        Precautions: none         Manuals 8/3 8/5 8/23 8/26 8/31        Prone Lumbar   8 min 8 min  8 min 8 min                                                Neuro Re-Ed             Sitting with lumbar roll Pt edu  HEP review  Pt edu        TA engagement NV 3"x20  3"x20  standing DLS w PB 1"T48  standing DLS w PB 3"J55         Adductor set NV 3"x20  3"x20           Glute set NV  bridge x20          Hooklying TB hip abd  Red 2x10 ea  Red 2x10 ea  Red 2x10 ea          Prone hip ext   2x10 ea          Paloff press    Green x20 ea  Green x20 ea         Standing hip abd/ext     Green 2x10 ea         Ther Ex             Pt Edu KS  KS  KS        Standing lumbar extension x10  2x10   2x10 2x10         Prone press up on elbows 2x10  2x10  2x10  Lock & sag x 10          Prone lying during heat/stim 10 min 10 min 10 min  10 min 10 min        Recumbent bike w roll  7 min 10 min  10 min 10 min         Supine nerve glides   x20           Piriformis stretch   5x20"           Prone quad stretch w strap   5x20" L; R by PT          Hamstring stretch by PT   Trial; positive neural tension           Kneeling hip flexor stretch     5x20" ea        Ther Activity             Mini squat    2x10          Box lift    Box + 10# x 5         Kettlebell carry-outstretched arms    10# 20 ft x 6         Gait Training                                       Modalities             TENS- B lumbar paraspinals 10' 10' 10' 10' 10'        Hot pack 10' 10' 10' 10' 10'

## 2021-09-07 ENCOUNTER — OFFICE VISIT (OUTPATIENT)
Dept: PHYSICAL THERAPY | Facility: CLINIC | Age: 46
End: 2021-09-07
Payer: COMMERCIAL

## 2021-09-07 DIAGNOSIS — M54.41 ACUTE RIGHT-SIDED LOW BACK PAIN WITH RIGHT-SIDED SCIATICA: Primary | ICD-10-CM

## 2021-09-07 PROCEDURE — 97530 THERAPEUTIC ACTIVITIES: CPT | Performed by: PHYSICAL THERAPIST

## 2021-09-07 PROCEDURE — 97140 MANUAL THERAPY 1/> REGIONS: CPT | Performed by: PHYSICAL THERAPIST

## 2021-09-07 PROCEDURE — 97110 THERAPEUTIC EXERCISES: CPT | Performed by: PHYSICAL THERAPIST

## 2021-09-07 NOTE — PROGRESS NOTES
Trial; positive neural tension           Kneeling hip flexor stretch     5x20" ea 5x20" ea       Ther Activity             Mini squat    2x10   w 10# KB 2x10        Box lift    Box + 10# x 5  Box + 25# x10        Kettlebell carry-outstretched arms    10# 20 ft x 6 10# 20 ft x 6 10# 20 ft x 10       Gait Training                                       Modalities             TENS- B lumbar paraspinals 10' 10' 10' 10' 10' 10'       Hot pack 10' 10' 10' 10' 10' 10'

## 2021-09-09 ENCOUNTER — APPOINTMENT (OUTPATIENT)
Dept: PHYSICAL THERAPY | Facility: CLINIC | Age: 46
End: 2021-09-09
Payer: COMMERCIAL

## 2021-09-10 ENCOUNTER — APPOINTMENT (OUTPATIENT)
Dept: PHYSICAL THERAPY | Facility: CLINIC | Age: 46
End: 2021-09-10
Payer: COMMERCIAL

## 2021-09-13 ENCOUNTER — OFFICE VISIT (OUTPATIENT)
Dept: PHYSICAL THERAPY | Facility: CLINIC | Age: 46
End: 2021-09-13
Payer: COMMERCIAL

## 2021-09-13 DIAGNOSIS — M54.41 ACUTE RIGHT-SIDED LOW BACK PAIN WITH RIGHT-SIDED SCIATICA: Primary | ICD-10-CM

## 2021-09-13 PROCEDURE — 97112 NEUROMUSCULAR REEDUCATION: CPT | Performed by: PHYSICAL THERAPIST

## 2021-09-13 PROCEDURE — 97110 THERAPEUTIC EXERCISES: CPT | Performed by: PHYSICAL THERAPIST

## 2021-09-13 PROCEDURE — G0283 ELEC STIM OTHER THAN WOUND: HCPCS | Performed by: PHYSICAL THERAPIST

## 2021-09-13 PROCEDURE — 97140 MANUAL THERAPY 1/> REGIONS: CPT | Performed by: PHYSICAL THERAPIST

## 2021-09-13 PROCEDURE — 97014 ELECTRIC STIMULATION THERAPY: CPT | Performed by: PHYSICAL THERAPIST

## 2021-09-13 NOTE — PROGRESS NOTES
Daily Note     Today's date: 2021  Patient name: Bari Boogie  : 1975  MRN: [id number]  Referring provider: Kourtney Flores MD  Dx:   Encounter Diagnosis     ICD-10-CM    1  Acute right-sided low back pain with right-sided sciatica  M54 41                   Subjective: Patient states he's now able to bend to put his shoes on  He states he feels ready to perform work tasks  Objective: See treatment diary below      Assessment: Tolerated treatment well  No pain with  throughout thoracic/lumbar region  No difficulty throughout exercise program   He is scheduled to return to work on , but will follow up with his PCP on   Patient would benefit from continued PT to finish out plan of care  Plan: Continue per plan of care        Precautions: none         Manuals 8/3 8/5 8/23 8/26 8/31 9/7 9/13      Prone Lumbar   8 min 8 min  8 min 8 min  10 min 8 min                                              Neuro Re-Ed             Sitting with lumbar roll Pt edu  HEP review  Pt edu        TA engagement NV 3"x20  3"x20  standing DLS w PB 4"C63  standing DLS w PB 0"I63         Adductor set NV 3"x20  3"x20           Glute set NV  bridge x20          Hooklying TB hip abd  Red 2x10 ea  Red 2x10 ea  Red 2x10 ea          Prone hip ext   2x10 ea          Paloff press    Green x20 ea  Green x20 ea  Green x20 ea  foam Green x20 ea       Standing hip abd/ext     Green 2x10 ea  Green 2x10 ea  Green x20 ea       Side step      Green x6 Green x 6      Ther Ex             Pt Edu KS  KS  KS        Standing lumbar extension x10  2x10   2x10 2x10         Prone press up on elbows 2x10  2x10  2x10  Lock & sag x 10   2x10  Lock & sag 2x10       Prone lying during heat/stim 10 min 10 min 10 min  10 min 10 min 10 min 10 min       Recumbent bike w roll  7 min 10 min  10 min 10 min  10 min 10 min       Supine nerve glides   x20           Piriformis stretch   5x20"           Prone quad stretch w strap 5x20" L; R by PT    w strap 5x20" ea      Hamstring stretch by PT   Trial; positive neural tension           Kneeling hip flexor stretch     5x20" ea 5x20" ea       Ther Activity             Mini squat    2x10   w 10# KB 2x10  Leg press 100# 2x10       Box lift    Box + 10# x 5  Box + 25# x10        Kettlebell carry-outstretched arms    10# 20 ft x 6 10# 20 ft x 6 10# 20 ft x 10 10# 20 ft x 8       Sled push- fwd/bwd       90# 20 ft x 8 total       Gait Training                                       Modalities             TENS- B lumbar paraspinals 10' 10' 10' 10' 10' 10' 10'      Hot pack 10' 10' 10' 10' 10' 10' 10'

## 2021-09-16 ENCOUNTER — APPOINTMENT (OUTPATIENT)
Dept: PHYSICAL THERAPY | Facility: CLINIC | Age: 46
End: 2021-09-16
Payer: COMMERCIAL

## 2021-09-17 ENCOUNTER — OFFICE VISIT (OUTPATIENT)
Dept: PHYSICAL THERAPY | Facility: CLINIC | Age: 46
End: 2021-09-17
Payer: COMMERCIAL

## 2021-09-17 DIAGNOSIS — M54.41 ACUTE RIGHT-SIDED LOW BACK PAIN WITH RIGHT-SIDED SCIATICA: Primary | ICD-10-CM

## 2021-09-17 PROCEDURE — 97110 THERAPEUTIC EXERCISES: CPT | Performed by: PHYSICAL THERAPIST

## 2021-09-17 PROCEDURE — 97112 NEUROMUSCULAR REEDUCATION: CPT | Performed by: PHYSICAL THERAPIST

## 2021-09-17 PROCEDURE — 97140 MANUAL THERAPY 1/> REGIONS: CPT | Performed by: PHYSICAL THERAPIST

## 2021-09-17 PROCEDURE — 97014 ELECTRIC STIMULATION THERAPY: CPT | Performed by: PHYSICAL THERAPIST

## 2021-09-17 PROCEDURE — G0283 ELEC STIM OTHER THAN WOUND: HCPCS | Performed by: PHYSICAL THERAPIST

## 2021-09-17 NOTE — PROGRESS NOTES
PT Discharge    Today's date: 2021  Patient name: Rosey Lucia  : 1975  MRN: [id number]  Referring provider: Odalys Savage MD  Dx:   Encounter Diagnosis     ICD-10-CM    1  Acute right-sided low back pain with right-sided sciatica  M54 41                   Assessment  Assessment details: Patient is a 50 y/o male with resolved lumbar pain  He presents with decreased pain, improved lumbar ROM, improved hip strength, and overall improved functional mobility since starting therapy  He is pleased with his progress in therapy and will return to work next week  He has followed up with his PCP and was cleared to return to work  All questions answered  He will call or email me with any questions or concerns  D/C to HEP  Impairments: abnormal or restricted ROM, activity intolerance, impaired physical strength, lacks appropriate home exercise program and pain with function  Understanding of Dx/Px/POC: good   Prognosis: good    Goals  STG within 4 weeks:   1  Patient to be independent in HEP  MET  2  Reduce pain by 50% to improve quality of life  MET   3  Improve lumbar extension ROM to no more than minimal limitation  MET  LTG within 8 weeks:   1  Patient to be independent in ADLs/IADLs without difficulty  MET  2  Patient to be able to sit with lumbar roll for 10-20 minutes  MET  3  Patient to be able to drive short distances with minimal pain  MET    Plan  Plan details: D/C to HEP     Patient would benefit from: skilled physical therapy and PT eval  Planned modality interventions: cryotherapy, hydrotherapy, unattended electrical stimulation, H-Wave, TENS and traction  Planned therapy interventions: therapeutic training, therapeutic exercise, therapeutic activities, stretching, strengthening, postural training, patient education, neuromuscular re-education, manual therapy, joint mobilization, IADL retraining, activity modification, ADL retraining, ADL training, body mechanics training, flexibility, functional ROM exercises, gait training, graded activity, graded exercise, graded motor, home exercise program and abdominal trunk stabilization  Frequency: 2x week  Treatment plan discussed with: patient        Subjective Evaluation    History of Present Illness  Onset date: two weeks ago  Mechanism of injury: Patient is a 52 y/o male with chief complaints of right sided low back pain that radiates down his right leg into his foot  This began two weeks ago when he lifted a tree trunk  He states he was bent over and his pain began about 5 minutes after lifting  He reports weakness within his RLE  He denies any bowel/bladder symptoms or saddle anesthesia  He was seen at PCP office and referred for evaluation and treatment of low back pain  21: He reports 95% global rating of improvement since starting therapy  He is independent in HEP  He saw his PCP today and will be returning to work on Monday  All questions answered  He is in agreement to d/c to HEP             Not a recurrent problem   Pain  Current pain ratin  At best pain ratin  At worst pain rating: 3  Quality: sharp  Progression: resolved    Social Support  Steps to enter house: yes  Stairs in house: yes   Lives in: multiple-level home  Lives with: spouse and young children    Working: [employer]- not currently working   Exercise history: active with work, but no formal exercise       Diagnostic Tests  No diagnostic tests performed  Treatments  Current treatment: medication  Patient Goals  Patient goals for therapy: return to work  Patient goal: "get well"         Objective     Active Range of Motion     Lumbar   Flexion:  WFL  Extension:  WFL  Left lateral flexion:  WFL  Right lateral flexion:  [facility]  Mechanical Assessment    Cervical      Thoracic      Lumbar    Standing extension: repeated movements  Pain location: centralized  Pain intensity: better  Pain level: decreased  Lying extension: repeated movements  Pain location: centralized  Pain intensity: better  Pain level: decreased    Strength/Myotome Testing     Left Hip   Planes of Motion   Flexion: 5  Extension: 5  Abduction: 5    Right Hip   Planes of Motion   Flexion: 5  Extension: 5  Abduction: 5    Left Knee   Flexion: 5  Extension: 5    Right Knee   Flexion: 5  Extension: 5    Left Ankle/Foot   Dorsiflexion: 4+  Great toe extension: 5    Right Ankle/Foot   Dorsiflexion: 4+  Great toe extension: 5    General Comments:      Lumbar Comments  Right anterior innominate rotation; corrected with self MET today              Precautions: none       Manuals 8/3 8/5 8/23 8/26 8/31 9/7 9/13 9/17     Prone Lumbar   8 min 8 min  8 min 8 min  10 min 8 min  8 min      D/C eval         KS                               Neuro Re-Ed             Sitting with lumbar roll Pt edu  HEP review  Pt edu        TA engagement NV 3"x20  3"x20  standing DLS w PB 0"B95  standing DLS w PB 0"F89         Adductor set NV 3"x20  3"x20           Glute set NV  bridge x20          Hooklying TB hip abd  Red 2x10 ea  Red 2x10 ea  Red 2x10 ea          Prone hip ext   2x10 ea          Paloff press    Green x20 ea  Green x20 ea  Green x20 ea  foam Green x20 ea  Foam green x20 ea      Standing hip abd/ext     Green 2x10 ea  Green 2x10 ea  Green x20 ea  Green x20 ea     Side step      Green x6 Green x 6 Green x 6     Ther Ex             Pt Edu KS  KS  KS        Standing lumbar extension x10  2x10   2x10 2x10         Prone press up on elbows 2x10  2x10  2x10  Lock & sag x 10   2x10  Lock & sag 2x10  2x10      Prone lying during heat/stim 10 min 10 min 10 min  10 min 10 min 10 min 10 min  10 min      Recumbent bike w roll  7 min 10 min  10 min 10 min  10 min 10 min  10 min      Supine nerve glides   x20           Piriformis stretch   5x20"      5x20" ea     Prone quad stretch w strap   5x20" L; R by PT    w strap 5x20" ea w strap 5x20" ea     Hamstring stretch by PT   Trial; positive neural tension      w strap 5x20"      Kneeling hip flexor stretch     5x20" ea 5x20" ea       Self MET- R hip ext, knee flex        w PB 3"x20      Ther Activity             Mini squat    2x10   w 10# KB 2x10  Leg press 100# 2x10  2x10      Box lift    Box + 10# x 5  Box + 25# x10        Kettlebell carry-outstretched arms    10# 20 ft x 6 10# 20 ft x 6 10# 20 ft x 10 10# 20 ft x 8  10# 20 ft x 8     Sled push- fwd/bwd       90# 20 ft x 8 total  90# 20 ft x 8      Gait Training                                       Modalities             TENS- B lumbar paraspinals 10' 10' 10' 10' 10' 10' 10' 10'     Hot pack 10' 10' 10' 10' 10' 10' 10'  10'          [employer]    Access Code: [email or web address]

## 2022-04-15 ENCOUNTER — HOSPITAL ENCOUNTER (OUTPATIENT)
Dept: MRI IMAGING | Facility: HOSPITAL | Age: 47
Discharge: HOME | End: 2022-04-15
Payer: COMMERCIAL

## 2022-04-15 DIAGNOSIS — M51.16 NEURITIS OR RADICULITIS DUE TO RUPTURE OF LUMBAR INTERVERTEBRAL DISC: ICD-10-CM

## 2022-04-15 PROCEDURE — 72148 MRI LUMBAR SPINE W/O DYE: CPT
